# Patient Record
Sex: FEMALE | Race: OTHER | HISPANIC OR LATINO
[De-identification: names, ages, dates, MRNs, and addresses within clinical notes are randomized per-mention and may not be internally consistent; named-entity substitution may affect disease eponyms.]

---

## 2021-10-28 ENCOUNTER — TRANSCRIPTION ENCOUNTER (OUTPATIENT)
Age: 31
End: 2021-10-28

## 2022-06-30 ENCOUNTER — NON-APPOINTMENT (OUTPATIENT)
Age: 32
End: 2022-06-30

## 2022-08-18 ENCOUNTER — NON-APPOINTMENT (OUTPATIENT)
Age: 32
End: 2022-08-18

## 2022-08-25 ENCOUNTER — NON-APPOINTMENT (OUTPATIENT)
Age: 32
End: 2022-08-25

## 2022-08-25 ENCOUNTER — APPOINTMENT (OUTPATIENT)
Dept: PLASTIC SURGERY | Facility: CLINIC | Age: 32
End: 2022-08-25

## 2022-08-25 VITALS
WEIGHT: 178 LBS | SYSTOLIC BLOOD PRESSURE: 118 MMHG | OXYGEN SATURATION: 98 % | DIASTOLIC BLOOD PRESSURE: 72 MMHG | RESPIRATION RATE: 20 BRPM | HEIGHT: 62 IN | BODY MASS INDEX: 32.76 KG/M2 | TEMPERATURE: 99.4 F | HEART RATE: 86 BPM

## 2022-08-25 DIAGNOSIS — Z87.39 PERSONAL HISTORY OF OTHER DISEASES OF THE MUSCULOSKELETAL SYSTEM AND CONNECTIVE TISSUE: ICD-10-CM

## 2022-08-25 DIAGNOSIS — S16.1XXD STRAIN OF MUSCLE, FASCIA AND TENDON AT NECK LEVEL, SUBSEQUENT ENCOUNTER: ICD-10-CM

## 2022-08-25 PROBLEM — Z00.00 ENCOUNTER FOR PREVENTIVE HEALTH EXAMINATION: Status: ACTIVE | Noted: 2022-08-25

## 2022-08-25 PROCEDURE — 99203 OFFICE O/P NEW LOW 30 MIN: CPT

## 2022-08-25 RX ORDER — NORGESTIMATE AND ETHINYL ESTRADIOL 7DAYSX3 28
KIT ORAL
Refills: 0 | Status: ACTIVE | COMMUNITY

## 2022-08-25 NOTE — REASON FOR VISIT
[Consultation] : a consultation visit [FreeTextEntry1] : 32 year-old woman with back, neck and shoulder pain due to the large size of her breasts

## 2022-08-25 NOTE — PHYSICAL EXAM
[NI] : Normal [de-identified] : Bilateral macromastia with ptosis, \par sternal notch to nipple is 27 c bilaterally\par [place]en areola with decreased sensation \par No palpable masses and no adenopathy

## 2022-08-25 NOTE — ASSESSMENT
[FreeTextEntry1] : A:\par Symptomatic macromastia following the birth of her child one year ago\par P;\par We discussed the treatment options and favoe reduction mammaplasty after review of the operative report to ascertain the pedicle used.  Reviewed the material risks,  benefits,  and alternatives with Ms. CHESTER JAYDE  , including no surgery, and she  understands and wishes to proceed.\par

## 2022-08-25 NOTE — HISTORY OF PRESENT ILLNESS
[FreeTextEntry1] : Patient underwent breast reduction in 2018 in [place] complicated by 6 months wound healing issues and loss of portion of the areola (attempting to obtain operative report).  SHe had a child one year ago with increase in breast size and presents with back, neck, and shoulder problems due to the large size of her breasts.  She is otherwise in good health and is a non-smoker  \par

## 2023-01-10 ENCOUNTER — TRANSCRIPTION ENCOUNTER (OUTPATIENT)
Age: 33
End: 2023-01-10

## 2023-01-10 ENCOUNTER — APPOINTMENT (OUTPATIENT)
Dept: PLASTIC SURGERY | Facility: HOSPITAL | Age: 33
End: 2023-01-10
Payer: MEDICAID

## 2023-01-10 ENCOUNTER — RESULT REVIEW (OUTPATIENT)
Age: 33
End: 2023-01-10

## 2023-01-10 PROCEDURE — 19318 BREAST REDUCTION: CPT | Mod: 50

## 2023-01-11 ENCOUNTER — TRANSCRIPTION ENCOUNTER (OUTPATIENT)
Age: 33
End: 2023-01-11

## 2023-01-18 ENCOUNTER — APPOINTMENT (OUTPATIENT)
Dept: PLASTIC SURGERY | Facility: CLINIC | Age: 33
End: 2023-01-18
Payer: MEDICAID

## 2023-01-18 ENCOUNTER — APPOINTMENT (OUTPATIENT)
Dept: PLASTIC SURGERY | Facility: CLINIC | Age: 33
End: 2023-01-18

## 2023-01-18 VITALS
OXYGEN SATURATION: 100 % | TEMPERATURE: 98.5 F | HEART RATE: 87 BPM | DIASTOLIC BLOOD PRESSURE: 84 MMHG | SYSTOLIC BLOOD PRESSURE: 130 MMHG | RESPIRATION RATE: 20 BRPM

## 2023-01-18 PROCEDURE — 99024 POSTOP FOLLOW-UP VISIT: CPT

## 2023-01-18 NOTE — PHYSICAL EXAM
[NI] : Normal [de-identified] : Shape and contour are good\par incisions are clean no erythema or purulence\par flaps viable no collection

## 2023-01-18 NOTE — REASON FOR VISIT
[Post Op: _________] : a [unfilled] post op visit [FreeTextEntry1] : Ms. CHESTER DONOHEU returns for a follow up visit, generally doing well with no complaints and no fevers or chills at home, generally pleased with results of her surgery

## 2023-01-18 NOTE — ASSESSMENT
[FreeTextEntry1] : A:\par Doing well one week following reduction mammaplasty\par P:\par Instructions reviewed \par

## 2023-02-09 ENCOUNTER — APPOINTMENT (OUTPATIENT)
Dept: PLASTIC SURGERY | Facility: CLINIC | Age: 33
End: 2023-02-09
Payer: MEDICAID

## 2023-02-09 VITALS
SYSTOLIC BLOOD PRESSURE: 121 MMHG | DIASTOLIC BLOOD PRESSURE: 86 MMHG | HEART RATE: 82 BPM | RESPIRATION RATE: 20 BRPM | OXYGEN SATURATION: 100 %

## 2023-02-09 DIAGNOSIS — N62 HYPERTROPHY OF BREAST: ICD-10-CM

## 2023-02-09 PROCEDURE — 99024 POSTOP FOLLOW-UP VISIT: CPT

## 2023-02-09 NOTE — ASSESSMENT
[FreeTextEntry1] : A:\par Doing well post operative\par P:\par Increase activity\par instructions reviewed \par

## 2023-02-09 NOTE — REASON FOR VISIT
[Follow-Up: _____] : a [unfilled] follow-up visit [FreeTextEntry1] : Ms. CHESTER DONOHUE returns for a follow up visit, generally doing well with no complaints and no fevers or chills at home, generally pleased with results of her surgery